# Patient Record
Sex: FEMALE | Race: WHITE | NOT HISPANIC OR LATINO | Employment: FULL TIME | URBAN - METROPOLITAN AREA
[De-identification: names, ages, dates, MRNs, and addresses within clinical notes are randomized per-mention and may not be internally consistent; named-entity substitution may affect disease eponyms.]

---

## 2024-11-06 ENCOUNTER — HOSPITAL ENCOUNTER (OUTPATIENT)
Facility: HOSPITAL | Age: 34
Discharge: HOSPICE | End: 2024-11-07
Attending: EMERGENCY MEDICINE | Admitting: EMERGENCY MEDICINE
Payer: COMMERCIAL

## 2024-11-06 ENCOUNTER — APPOINTMENT (OUTPATIENT)
Dept: CT IMAGING | Facility: HOSPITAL | Age: 34
End: 2024-11-06
Payer: COMMERCIAL

## 2024-11-06 DIAGNOSIS — R10.84 GENERALIZED ABDOMINAL PAIN: ICD-10-CM

## 2024-11-06 DIAGNOSIS — N20.0 KIDNEY STONE: Primary | ICD-10-CM

## 2024-11-06 LAB
ALBUMIN SERPL-MCNC: 4.5 G/DL (ref 3.5–5.2)
ALBUMIN/GLOB SERPL: 1.5 G/DL
ALP SERPL-CCNC: 92 U/L (ref 39–117)
ALT SERPL W P-5'-P-CCNC: 18 U/L (ref 1–33)
ANION GAP SERPL CALCULATED.3IONS-SCNC: 12.5 MMOL/L (ref 5–15)
AST SERPL-CCNC: 23 U/L (ref 1–32)
B-HCG UR QL: NEGATIVE
BACTERIA UR QL AUTO: ABNORMAL /HPF
BASOPHILS # BLD AUTO: 0.04 10*3/MM3 (ref 0–0.2)
BASOPHILS NFR BLD AUTO: 0.5 % (ref 0–1.5)
BILIRUB SERPL-MCNC: 0.3 MG/DL (ref 0–1.2)
BILIRUB UR QL STRIP: NEGATIVE
BUN SERPL-MCNC: 9 MG/DL (ref 6–20)
BUN/CREAT SERPL: 12.3 (ref 7–25)
CALCIUM SPEC-SCNC: 9.3 MG/DL (ref 8.6–10.5)
CHLORIDE SERPL-SCNC: 106 MMOL/L (ref 98–107)
CLARITY UR: ABNORMAL
CO2 SERPL-SCNC: 22.5 MMOL/L (ref 22–29)
COLOR UR: ABNORMAL
CREAT SERPL-MCNC: 0.73 MG/DL (ref 0.57–1)
DEPRECATED RDW RBC AUTO: 39.2 FL (ref 37–54)
EGFRCR SERPLBLD CKD-EPI 2021: 110.1 ML/MIN/1.73
EOSINOPHIL # BLD AUTO: 0.02 10*3/MM3 (ref 0–0.4)
EOSINOPHIL NFR BLD AUTO: 0.2 % (ref 0.3–6.2)
ERYTHROCYTE [DISTWIDTH] IN BLOOD BY AUTOMATED COUNT: 12.2 % (ref 12.3–15.4)
GLOBULIN UR ELPH-MCNC: 3.1 GM/DL
GLUCOSE SERPL-MCNC: 102 MG/DL (ref 65–99)
GLUCOSE UR STRIP-MCNC: NEGATIVE MG/DL
HCT VFR BLD AUTO: 40.5 % (ref 34–46.6)
HGB BLD-MCNC: 13.7 G/DL (ref 12–15.9)
HGB UR QL STRIP.AUTO: ABNORMAL
HOLD SPECIMEN: NORMAL
HOLD SPECIMEN: NORMAL
HYALINE CASTS UR QL AUTO: ABNORMAL /LPF
IMM GRANULOCYTES # BLD AUTO: 0.03 10*3/MM3 (ref 0–0.05)
IMM GRANULOCYTES NFR BLD AUTO: 0.3 % (ref 0–0.5)
KETONES UR QL STRIP: ABNORMAL
LEUKOCYTE ESTERASE UR QL STRIP.AUTO: ABNORMAL
LIPASE SERPL-CCNC: 27 U/L (ref 13–60)
LYMPHOCYTES # BLD AUTO: 1.11 10*3/MM3 (ref 0.7–3.1)
LYMPHOCYTES NFR BLD AUTO: 12.8 % (ref 19.6–45.3)
MCH RBC QN AUTO: 29.4 PG (ref 26.6–33)
MCHC RBC AUTO-ENTMCNC: 33.8 G/DL (ref 31.5–35.7)
MCV RBC AUTO: 86.9 FL (ref 79–97)
MONOCYTES # BLD AUTO: 0.45 10*3/MM3 (ref 0.1–0.9)
MONOCYTES NFR BLD AUTO: 5.2 % (ref 5–12)
NEUTROPHILS NFR BLD AUTO: 7.05 10*3/MM3 (ref 1.7–7)
NEUTROPHILS NFR BLD AUTO: 81 % (ref 42.7–76)
NITRITE UR QL STRIP: NEGATIVE
NRBC BLD AUTO-RTO: 0 /100 WBC (ref 0–0.2)
PH UR STRIP.AUTO: 5.5 [PH] (ref 5–8)
PLATELET # BLD AUTO: 338 10*3/MM3 (ref 140–450)
PMV BLD AUTO: 8.4 FL (ref 6–12)
POTASSIUM SERPL-SCNC: 3.3 MMOL/L (ref 3.5–5.2)
PROT SERPL-MCNC: 7.6 G/DL (ref 6–8.5)
PROT UR QL STRIP: ABNORMAL
RBC # BLD AUTO: 4.66 10*6/MM3 (ref 3.77–5.28)
RBC # UR STRIP: ABNORMAL /HPF
REF LAB TEST METHOD: ABNORMAL
SODIUM SERPL-SCNC: 141 MMOL/L (ref 136–145)
SP GR UR STRIP: 1.03 (ref 1–1.03)
SQUAMOUS #/AREA URNS HPF: ABNORMAL /HPF
UROBILINOGEN UR QL STRIP: ABNORMAL
WBC # UR STRIP: ABNORMAL /HPF
WBC NRBC COR # BLD AUTO: 8.7 10*3/MM3 (ref 3.4–10.8)
WHOLE BLOOD HOLD COAG: NORMAL
WHOLE BLOOD HOLD SPECIMEN: NORMAL

## 2024-11-06 PROCEDURE — 25010000002 CEFTRIAXONE PER 250 MG

## 2024-11-06 PROCEDURE — 25010000002 ONDANSETRON PER 1 MG

## 2024-11-06 PROCEDURE — 25510000001 IOPAMIDOL PER 1 ML

## 2024-11-06 PROCEDURE — 80053 COMPREHEN METABOLIC PANEL: CPT

## 2024-11-06 PROCEDURE — 99285 EMERGENCY DEPT VISIT HI MDM: CPT

## 2024-11-06 PROCEDURE — 81025 URINE PREGNANCY TEST: CPT

## 2024-11-06 PROCEDURE — 87040 BLOOD CULTURE FOR BACTERIA: CPT

## 2024-11-06 PROCEDURE — 25810000003 SODIUM CHLORIDE 0.9 % SOLUTION

## 2024-11-06 PROCEDURE — 87086 URINE CULTURE/COLONY COUNT: CPT

## 2024-11-06 PROCEDURE — 83690 ASSAY OF LIPASE: CPT

## 2024-11-06 PROCEDURE — 81001 URINALYSIS AUTO W/SCOPE: CPT

## 2024-11-06 PROCEDURE — G0378 HOSPITAL OBSERVATION PER HR: HCPCS

## 2024-11-06 PROCEDURE — 36415 COLL VENOUS BLD VENIPUNCTURE: CPT

## 2024-11-06 PROCEDURE — 74177 CT ABD & PELVIS W/CONTRAST: CPT

## 2024-11-06 PROCEDURE — 25010000002 MORPHINE PER 10 MG

## 2024-11-06 PROCEDURE — 96375 TX/PRO/DX INJ NEW DRUG ADDON: CPT

## 2024-11-06 PROCEDURE — 85025 COMPLETE CBC W/AUTO DIFF WBC: CPT

## 2024-11-06 PROCEDURE — 96365 THER/PROPH/DIAG IV INF INIT: CPT

## 2024-11-06 RX ORDER — BISACODYL 5 MG/1
5 TABLET, DELAYED RELEASE ORAL DAILY PRN
Status: DISCONTINUED | OUTPATIENT
Start: 2024-11-06 | End: 2024-11-07 | Stop reason: HOSPADM

## 2024-11-06 RX ORDER — ONDANSETRON 2 MG/ML
4 INJECTION INTRAMUSCULAR; INTRAVENOUS ONCE
Status: COMPLETED | OUTPATIENT
Start: 2024-11-06 | End: 2024-11-06

## 2024-11-06 RX ORDER — SODIUM CHLORIDE 0.9 % (FLUSH) 0.9 %
10 SYRINGE (ML) INJECTION AS NEEDED
Status: DISCONTINUED | OUTPATIENT
Start: 2024-11-06 | End: 2024-11-07 | Stop reason: HOSPADM

## 2024-11-06 RX ORDER — IOPAMIDOL 755 MG/ML
100 INJECTION, SOLUTION INTRAVASCULAR
Status: COMPLETED | OUTPATIENT
Start: 2024-11-06 | End: 2024-11-06

## 2024-11-06 RX ORDER — BISACODYL 10 MG
10 SUPPOSITORY, RECTAL RECTAL DAILY PRN
Status: DISCONTINUED | OUTPATIENT
Start: 2024-11-06 | End: 2024-11-07 | Stop reason: HOSPADM

## 2024-11-06 RX ORDER — AMOXICILLIN 250 MG
2 CAPSULE ORAL 2 TIMES DAILY PRN
Status: DISCONTINUED | OUTPATIENT
Start: 2024-11-06 | End: 2024-11-07 | Stop reason: HOSPADM

## 2024-11-06 RX ORDER — POLYETHYLENE GLYCOL 3350 17 G/17G
17 POWDER, FOR SOLUTION ORAL DAILY PRN
Status: DISCONTINUED | OUTPATIENT
Start: 2024-11-06 | End: 2024-11-07 | Stop reason: HOSPADM

## 2024-11-06 RX ORDER — ONDANSETRON 2 MG/ML
4 INJECTION INTRAMUSCULAR; INTRAVENOUS EVERY 6 HOURS PRN
Status: DISCONTINUED | OUTPATIENT
Start: 2024-11-06 | End: 2024-11-07 | Stop reason: HOSPADM

## 2024-11-06 RX ORDER — SODIUM CHLORIDE 0.9 % (FLUSH) 0.9 %
10 SYRINGE (ML) INJECTION EVERY 12 HOURS SCHEDULED
Status: DISCONTINUED | OUTPATIENT
Start: 2024-11-06 | End: 2024-11-07 | Stop reason: HOSPADM

## 2024-11-06 RX ORDER — NALOXONE HCL 0.4 MG/ML
0.4 VIAL (ML) INJECTION
Status: DISCONTINUED | OUTPATIENT
Start: 2024-11-06 | End: 2024-11-07 | Stop reason: HOSPADM

## 2024-11-06 RX ORDER — SODIUM CHLORIDE 9 MG/ML
75 INJECTION, SOLUTION INTRAVENOUS CONTINUOUS
Status: DISCONTINUED | OUTPATIENT
Start: 2024-11-06 | End: 2024-11-07 | Stop reason: HOSPADM

## 2024-11-06 RX ORDER — POTASSIUM CHLORIDE 1500 MG/1
40 TABLET, EXTENDED RELEASE ORAL EVERY 4 HOURS
Status: COMPLETED | OUTPATIENT
Start: 2024-11-06 | End: 2024-11-07

## 2024-11-06 RX ADMIN — MORPHINE SULFATE 4 MG: 4 INJECTION, SOLUTION INTRAMUSCULAR; INTRAVENOUS at 20:25

## 2024-11-06 RX ADMIN — ONDANSETRON 4 MG: 2 INJECTION, SOLUTION INTRAMUSCULAR; INTRAVENOUS at 20:25

## 2024-11-06 RX ADMIN — SODIUM CHLORIDE 1000 ML: 9 INJECTION, SOLUTION INTRAVENOUS at 20:25

## 2024-11-06 RX ADMIN — CEFTRIAXONE 2000 MG: 2 INJECTION, POWDER, FOR SOLUTION INTRAMUSCULAR; INTRAVENOUS at 22:20

## 2024-11-06 RX ADMIN — IOPAMIDOL 100 ML: 755 INJECTION, SOLUTION INTRAVENOUS at 20:54

## 2024-11-06 NOTE — ED TRIAGE NOTES
Pt arrived via PV c/o right sided flank pain that started approx 2 hours ago. PT reports n/v as well. Denies hx of kidney stones

## 2024-11-07 VITALS
RESPIRATION RATE: 17 BRPM | TEMPERATURE: 98 F | BODY MASS INDEX: 39.84 KG/M2 | WEIGHT: 224.87 LBS | SYSTOLIC BLOOD PRESSURE: 112 MMHG | HEIGHT: 63 IN | HEART RATE: 85 BPM | OXYGEN SATURATION: 95 % | DIASTOLIC BLOOD PRESSURE: 75 MMHG

## 2024-11-07 LAB
ANION GAP SERPL CALCULATED.3IONS-SCNC: 7.3 MMOL/L (ref 5–15)
BACTERIA SPEC AEROBE CULT: NORMAL
BASOPHILS # BLD AUTO: 0.04 10*3/MM3 (ref 0–0.2)
BASOPHILS NFR BLD AUTO: 0.6 % (ref 0–1.5)
BUN SERPL-MCNC: 8 MG/DL (ref 6–20)
BUN/CREAT SERPL: 12.3 (ref 7–25)
CALCIUM SPEC-SCNC: 8.6 MG/DL (ref 8.6–10.5)
CHLORIDE SERPL-SCNC: 109 MMOL/L (ref 98–107)
CO2 SERPL-SCNC: 24.7 MMOL/L (ref 22–29)
CREAT SERPL-MCNC: 0.65 MG/DL (ref 0.57–1)
DEPRECATED RDW RBC AUTO: 40.5 FL (ref 37–54)
EGFRCR SERPLBLD CKD-EPI 2021: 117.9 ML/MIN/1.73
EOSINOPHIL # BLD AUTO: 0.08 10*3/MM3 (ref 0–0.4)
EOSINOPHIL NFR BLD AUTO: 1.2 % (ref 0.3–6.2)
ERYTHROCYTE [DISTWIDTH] IN BLOOD BY AUTOMATED COUNT: 12.4 % (ref 12.3–15.4)
GLUCOSE SERPL-MCNC: 91 MG/DL (ref 65–99)
HCT VFR BLD AUTO: 33.9 % (ref 34–46.6)
HGB BLD-MCNC: 11 G/DL (ref 12–15.9)
IMM GRANULOCYTES # BLD AUTO: 0.01 10*3/MM3 (ref 0–0.05)
IMM GRANULOCYTES NFR BLD AUTO: 0.1 % (ref 0–0.5)
LYMPHOCYTES # BLD AUTO: 1.95 10*3/MM3 (ref 0.7–3.1)
LYMPHOCYTES NFR BLD AUTO: 29 % (ref 19.6–45.3)
MCH RBC QN AUTO: 28.9 PG (ref 26.6–33)
MCHC RBC AUTO-ENTMCNC: 32.4 G/DL (ref 31.5–35.7)
MCV RBC AUTO: 89.2 FL (ref 79–97)
MONOCYTES # BLD AUTO: 0.73 10*3/MM3 (ref 0.1–0.9)
MONOCYTES NFR BLD AUTO: 10.9 % (ref 5–12)
NEUTROPHILS NFR BLD AUTO: 3.91 10*3/MM3 (ref 1.7–7)
NEUTROPHILS NFR BLD AUTO: 58.2 % (ref 42.7–76)
NRBC BLD AUTO-RTO: 0 /100 WBC (ref 0–0.2)
PLATELET # BLD AUTO: 294 10*3/MM3 (ref 140–450)
PMV BLD AUTO: 8.7 FL (ref 6–12)
POTASSIUM SERPL-SCNC: 3.3 MMOL/L (ref 3.5–5.2)
RBC # BLD AUTO: 3.8 10*6/MM3 (ref 3.77–5.28)
SODIUM SERPL-SCNC: 141 MMOL/L (ref 136–145)
WBC NRBC COR # BLD AUTO: 6.72 10*3/MM3 (ref 3.4–10.8)

## 2024-11-07 PROCEDURE — 25810000003 SODIUM CHLORIDE 0.9 % SOLUTION: Performed by: EMERGENCY MEDICINE

## 2024-11-07 PROCEDURE — 96376 TX/PRO/DX INJ SAME DRUG ADON: CPT

## 2024-11-07 PROCEDURE — 96361 HYDRATE IV INFUSION ADD-ON: CPT

## 2024-11-07 PROCEDURE — G0378 HOSPITAL OBSERVATION PER HR: HCPCS

## 2024-11-07 PROCEDURE — 25010000002 MORPHINE PER 10 MG: Performed by: EMERGENCY MEDICINE

## 2024-11-07 PROCEDURE — 80048 BASIC METABOLIC PNL TOTAL CA: CPT | Performed by: EMERGENCY MEDICINE

## 2024-11-07 PROCEDURE — 85025 COMPLETE CBC W/AUTO DIFF WBC: CPT | Performed by: EMERGENCY MEDICINE

## 2024-11-07 RX ADMIN — SODIUM CHLORIDE 75 ML/HR: 9 INJECTION, SOLUTION INTRAVENOUS at 00:37

## 2024-11-07 RX ADMIN — POTASSIUM CHLORIDE 40 MEQ: 1500 TABLET, EXTENDED RELEASE ORAL at 04:05

## 2024-11-07 RX ADMIN — Medication 10 ML: at 00:37

## 2024-11-07 RX ADMIN — POTASSIUM CHLORIDE 40 MEQ: 1500 TABLET, EXTENDED RELEASE ORAL at 00:37

## 2024-11-07 RX ADMIN — MORPHINE SULFATE 4 MG: 4 INJECTION, SOLUTION INTRAMUSCULAR; INTRAVENOUS at 04:05

## 2024-11-07 NOTE — CASE MANAGEMENT/SOCIAL WORK
Case Management Discharge Note      Final Note: Routine home                      Final Discharge Disposition Code: 01 - home or self-care

## 2024-11-07 NOTE — DISCHARGE SUMMARY
"[facility]    Provider, No Known    CHIEF COMPLAINT:     Right flank pain    HISTORY OF PRESENT ILLNESS:    Flank Pain      ED  34-year-old female who reports today with right-sided flank pain. States she is also having some nausea vomiting subjective fever symptoms feels dehydrated and has been getting dizzy and feeling \"heavy\". Denies any dysuria. Patient is also currently on her menstrual period. Denies any history of abdominal surgeries, previous kidney stones, or pregnancy.     History reviewed. No pertinent past medical history.  Past Surgical History:   Procedure Laterality Date    OVARIAN CYST REMOVAL      TUMOR REMOVAL Left     L hand     History reviewed. No pertinent family history.  Social History     Tobacco Use    Smoking status: Never    Smokeless tobacco: Never   Vaping Use    Vaping status: Never Used   Substance Use Topics    Alcohol use: Never     Comment: occasional    Drug use: Never     No medications prior to admission.     Allergies:  Metal [nickel]      There is no immunization history on file for this patient.        REVIEW OF SYSTEMS:    Review of Systems   Gastrointestinal:  Positive for nausea and vomiting.   Genitourinary:  Positive for flank pain.           Vital Signs  Temp:  [97.7 °F (36.5 °C)-98.6 °F (37 °C)] 97.7 °F (36.5 °C)  Heart Rate:  [89-97] 97  Resp:  [16-20] 20  BP: ()/(69-81) 95/69          Physical Exam:  Physical Exam  Constitutional:       Appearance: Normal appearance.   Cardiovascular:      Rate and Rhythm: Normal rate and regular rhythm.   Abdominal:      Palpations: Abdomen is soft.   Skin:     General: Skin is warm and dry.   Neurological:      General: No focal deficit present.      Mental Status: She is alert and oriented to person, place, and time.   Psychiatric:         Mood and Affect: Mood normal.         Behavior: Behavior normal.     Emotional Behavior:    wnl   Debilities:   None      Results Review:    I reviewed the " patient's new clinical results.  Lab Results (most recent)       Procedure Component Value Units Date/Time    Basic Metabolic Panel [[id number]]  (Abnormal) Collected: 11/07/24 0415    Specimen: Blood Updated: 11/07/24 0539     Glucose 91 mg/dL      BUN 8 mg/dL      Creatinine 0.65 mg/dL      Sodium 141 mmol/L      Potassium 3.3 mmol/L      Chloride 109 mmol/L      CO2 24.7 mmol/L      Calcium 8.6 mg/dL      BUN/Creatinine Ratio 12.3     Anion Gap 7.3 mmol/L      eGFR 117.9 mL/min/1.73     Narrative:      GFR Normal >60  Chronic Kidney Disease <60  Kidney Failure <15      CBC Auto Differential [[id number]]  (Abnormal) Collected: 11/07/24 0415    Specimen: Blood Updated: 11/07/24 0455     WBC 6.72 10*3/mm3      RBC 3.80 10*6/mm3      Hemoglobin 11.0 g/dL      Comment: Result checked          Hematocrit 33.9 %      MCV 89.2 fL      MCH 28.9 pg      MCHC 32.4 g/dL      RDW 12.4 %      RDW-SD 40.5 fl      MPV 8.7 fL      Platelets 294 10*3/mm3      Neutrophil % 58.2 %      Lymphocyte % 29.0 %      Monocyte % 10.9 %      Eosinophil % 1.2 %      Basophil % 0.6 %      Immature Grans % 0.1 %      Neutrophils, Absolute 3.91 10*3/mm3      Lymphocytes, Absolute 1.95 10*3/mm3      Monocytes, Absolute 0.73 10*3/mm3      Eosinophils, Absolute 0.08 10*3/mm3      Basophils, Absolute 0.04 10*3/mm3      Immature Grans, Absolute 0.01 10*3/mm3      nRBC 0.0 /100 WBC     Blood Culture - Blood, Arm, Left [[id number]] Collected: 11/06/24 2214    Specimen: Blood from Arm, Left Updated: 11/06/24 2218    Blood Culture - Blood, Arm, Right [[id number]] Collected: 11/06/24 2215    Specimen: Blood from Arm, Right Updated: 11/06/24 2218    Urine Culture - Urine, Urine, Clean Catch [[id number]] Collected: 11/06/24 1921    Specimen: Urine, Clean Catch Updated: 11/06/24 2019    Comprehensive Metabolic Panel [[id number]]  (Abnormal) Collected: 11/06/24 1908    Specimen: Blood from Arm, Right Updated: 11/06/24 1939     Glucose 102 mg/dL      BUN 9 mg/dL       Creatinine 0.73 mg/dL      Sodium 141 mmol/L      Potassium 3.3 mmol/L      Chloride 106 mmol/L      CO2 22.5 mmol/L      Calcium 9.3 mg/dL      Total Protein 7.6 g/dL      Albumin 4.5 g/dL      ALT (SGPT) 18 U/L      AST (SGOT) 23 U/L      Alkaline Phosphatase 92 U/L      Total Bilirubin 0.3 mg/dL      Globulin 3.1 gm/dL      A/G Ratio 1.5 g/dL      BUN/Creatinine Ratio 12.3     Anion Gap 12.5 mmol/L      eGFR 110.1 mL/min/1.73     Narrative:      GFR Normal >60  Chronic Kidney Disease <60  Kidney Failure <15      Lipase [[id number]]  (Normal) Collected: 11/06/24 1908    Specimen: Blood from Arm, Right Updated: 11/06/24 1939     Lipase 27 U/L     Pregnancy, Urine - Urine, Clean Catch [[id number]]  (Normal) Collected: 11/06/24 1921    Specimen: Urine, Clean Catch Updated: 11/06/24 1937     HCG, Urine QL Negative    Urinalysis With Microscopic If Indicated (No Culture) - Urine, Clean Catch [[id number]]  (Abnormal) Collected: 11/06/24 1921    Specimen: Urine, Clean Catch Updated: 11/06/24 1934     Color, UA Dark Yellow     Appearance, UA Slightly Cloudy     pH, UA 5.5     Specific Gravity, UA 1.035     Glucose, UA Negative     Ketones, UA 15 mg/dL (1+)     Bilirubin, UA Negative     Blood, UA Large (3+)     Protein, UA 30 mg/dL (1+)     Leuk Esterase, UA Trace     Nitrite, UA Negative     Urobilinogen, UA 1.0 E.U./dL    Urinalysis, Microscopic Only - Urine, Clean Catch [[id number]]  (Abnormal) Collected: 11/06/24 1921    Specimen: Urine, Clean Catch Updated: 11/06/24 1934     RBC, UA Too Numerous to Count /HPF      WBC, UA 6-10 /HPF      Bacteria, UA None Seen /HPF      Squamous Epithelial Cells, UA 3-6 /HPF      Hyaline Casts, UA 0-2 /LPF      Methodology Automated Microscopy    CBC & Differential [[id number]]  (Abnormal) Collected: 11/06/24 1908    Specimen: Blood from Arm, Right Updated: 11/06/24 1916    Narrative:      The following orders were created for panel order CBC & Differential.  Procedure                                Abnormality         Status                     ---------                               -----------         ------                     CBC Auto Differential[[id number]]        Abnormal            Final result                 Please view results for these tests on the individual orders.    CBC Auto Differential [[id number]]  (Abnormal) Collected: 11/06/24 1908    Specimen: Blood from Arm, Right Updated: 11/06/24 1916     WBC 8.70 10*3/mm3      RBC 4.66 10*6/mm3      Hemoglobin 13.7 g/dL      Hematocrit 40.5 %      MCV 86.9 fL      MCH 29.4 pg      MCHC 33.8 g/dL      RDW 12.2 %      RDW-SD 39.2 fl      MPV 8.4 fL      Platelets 338 10*3/mm3      Neutrophil % 81.0 %      Lymphocyte % 12.8 %      Monocyte % 5.2 %      Eosinophil % 0.2 %      Basophil % 0.5 %      Immature Grans % 0.3 %      Neutrophils, Absolute 7.05 10*3/mm3      Lymphocytes, Absolute 1.11 10*3/mm3      Monocytes, Absolute 0.45 10*3/mm3      Eosinophils, Absolute 0.02 10*3/mm3      Basophils, Absolute 0.04 10*3/mm3      Immature Grans, Absolute 0.03 10*3/mm3      nRBC 0.0 /100 WBC     [place] Draw [[id number]] Collected: 11/06/24 1908    Specimen: Blood from Arm, Right Updated: 11/06/24 1915    Narrative:      The following orders were created for panel order [place] Draw.  Procedure                               Abnormality         Status                     ---------                               -----------         ------                     Green Top (Gel)[[id number]]                                  Final result               Lavender Top[[id number]]                                     Final result               Gold Top - [facility][[id number]]                                   Final result               Light Blue Top[[id number]]                                   Final result                 Please view results for these tests on the individual orders.    Green Top (Gel) [[id number]] Collected: 11/06/24 1908    Specimen: Blood from Arm, Right  Updated: 11/06/24 1915     Extra Tube Hold for add-ons.     Comment: Auto resulted.       Lavender Top [[id number]] Collected: 11/06/24 1908    Specimen: Blood from Arm, Right Updated: 11/06/24 1915     Extra Tube hold for add-on     Comment: Auto resulted       Gold Top - [facility] [[id number]] Collected: 11/06/24 1908    Specimen: Blood from Arm, Right Updated: 11/06/24 1915     Extra Tube Hold for add-ons.     Comment: Auto resulted.       Light Blue Top [[id number]] Collected: 11/06/24 1908    Specimen: Blood from Arm, Right Updated: 11/06/24 1915     Extra Tube Hold for add-ons.     Comment: Auto resulted               Imaging Results (Most Recent)       Procedure Component Value Units Date/Time    CT Abdomen Pelvis With Contrast [[id number]] Collected: 11/06/24 2133     Updated: 11/06/24 2144    Narrative:      CT ABDOMEN PELVIS W CONTRAST    Date of Exam: 11/6/2024 8:40 PM EST    Indication: right flank pain, r/o stone.    Comparison: None available.    Technique: Axial CT images were obtained of the abdomen and pelvis following the uneventful intravenous administration of iodinated contrast. Sagittal and coronal reconstructions were performed.  Automated exposure control and iterative reconstruction   methods were used.          FINDINGS:    Abdomen/Pelvis:    Lower Chest: Limited imaging of the lung bases is grossly clear.    No free air noted below the diaphragm.    Organs: Moderate hydronephrosis on the right is noted secondary to a 8 mm calculus at the right UPJ. No definite additional stones are noted within the ureter. The right kidney is otherwise grossly unremarkable in appearance. Left kidney and left renal   collecting system are grossly unremarkable in appearance.    Mildly motion limited imaging of the liver, gallbladder, spleen, pancreas and adrenal glands are grossly unremarkable in appearance    GI/Bowel: Evaluation of the GI tract is motion limited. There is a small hiatal hernia. The stomach is  otherwise grossly unremarkable in appearance. Air and fluid-filled loops of small bowel are noted without evidence of obstruction. Increased number of   small lymph nodes within the mesentery is likely reactive. Ileocecal valve and appendix appear grossly unremarkable in appearance. Motion limited imaging of the colon demonstrates no acute abnormality.    Pelvis: Uterus, ovaries and the urinary bladder are grossly unremarkable in appearance. Small cyst within the left ovary likely physiologic.    Peritoneum/Retroperitoneum: The aorta is normal in caliber. There is no suspicious retroperitoneal adenopathy    Bones/Soft Tissues: No acute osseous abnormality is noted. Degenerative changes noted of the spine.      Impression:        1. There is moderate hydronephrosis secondary to a 8 mm calculus at the right UPJ.          Electronically Signed: Hector Gamino MD    11/6/2024 9:42 PM EST    Workstation ID: [id number]          reviewed    ECG/EMG Results (most recent)       Procedure Component Value Units Date/Time    Telemetry Scan [[id number]] Resulted: 11/06/24     Updated: 11/07/24 0552          reviewed            Microbiology Results (last 10 days)       ** No results found for the last 240 hours. **            Assessment & Plan     Kidney stone     Ureterolithiasis  - cbc, cmp, lipase unremarkable  - ua trace LE, large blood  - hcg negative  - ct abd reviewed and showing a moderate hydronephrosis secondary to a 8 mm calculus at the right UPJ.   - urology consulted  - pt given iv abx and fluids in er  - pt is to be discharged to [facility] for outpt procedure with Dr Moura        I discussed the patients findings and my recommendations with patient and nursing staff.     Discharge Diagnosis:      Kidney stone      Hospital Course  Patient is a 34 y.o. female presented with flank pain. Er evaluated and admitted to observation. Labs including cbc, cmp, lipase are normal. Hcg is negative. Ua shoing blood. Ct  abdomen shows 8mm stone in right upj.  Urology consulted and recommends discharge to [facility] for procedure this am. Discharge discussed with pt and she is agreeable to plan. Instructed pt to return to er if symptoms reoccur or worsen.      Past Medical History:   History reviewed. No pertinent past medical history.    Past Surgical History:     Past Surgical History:   Procedure Laterality Date    OVARIAN CYST REMOVAL      TUMOR REMOVAL Left     L hand       Social History:   Social History     Socioeconomic History    Marital status: Single   Tobacco Use    Smoking status: Never    Smokeless tobacco: Never   Vaping Use    Vaping status: Never Used   Substance and Sexual Activity    Alcohol use: Never     Comment: occasional    Drug use: Never    Sexual activity: Defer       Procedures Performed         Consults:   Consults       Date and Time Order Name Status Description    11/6/2024 10:11 PM Inpatient Urology Consult Completed             Condition on Discharge:     Stable    Discharge Disposition      Discharge Medications     Discharge Medications      Patient Not Prescribed Medications Upon Discharge         Discharge Diet:     Activity at Discharge:     Follow-up Appointments  No future appointments.      Test Results Pending at Discharge  Pending Labs       Order Current Status    Blood Culture - Blood, Arm, Left In process    Blood Culture - Blood, Arm, Right In process    Urine Culture - Urine, Urine, Clean Catch In process             Risk for Readmission (LACE) Score: 1 (11/7/2024  6:00 AM)      Less Than 30 minutes spent in discharge activities for this patient    Signature:Electronically signed by Vickie Rascon PA-C, 11/07/24, 7:35 AM EST.

## 2024-11-07 NOTE — ED NOTES
Nursing report ED to floor  Umm Castillo  34 y.o.  female    HPI:   Chief Complaint   Patient presents with    Flank Pain       Admitting doctor:   Bandar Walters MD    Admitting diagnosis:   The primary encounter diagnosis was Kidney stone. A diagnosis of Generalized abdominal pain was also pertinent to this visit.    Code status:   Current Code Status       Date Active Code Status Order ID Comments User Context       11/6/2024 2211 CPR (Attempt to Resuscitate) [id number]  Clementine Azar APRN ED        Question Answer    Code Status (Patient has no pulse and is not breathing) CPR (Attempt to Resuscitate)    Medical Interventions (Patient has pulse or is breathing) Full Support    Level Of Support Discussed With Patient                    Allergies:   Patient has no known allergies.    Isolation:  No active isolations     Fall Risk:  Fall Risk Assessment was completed, and patient is at low risk for falls.   Predictive Model Details         1 (Low) Factor Value    Calculated 11/6/2024 22:27 Age 34    Risk of Fall Model Active Peripheral IV Present     Imaging order in this encounter Present     Number of Distinct Medication Classes administered 5     Magnesium not on file     Drug Use Not Asked     Diastolic BP 70     Bubba Scale not on file     Number of administrations of Analgesic Narcotics 1     Respiratory Rate 16     Calcium 9.3 mg/dL     Days after Admission 0.156     Chloride 106 mmol/L     ALT 18 U/L     Albumin 4.5 g/dL     Tobacco Use Not Asked     Total Bilirubin 0.3 mg/dL     Potassium 3.3 mmol/L     Creatinine 0.73 mg/dL         Weight:       11/06/24  1824   Weight: 102 kg (224 lb 13.9 oz)       Intake and Output  No intake or output data in the 24 hours ending 11/06/24 2233    Diet:   Dietary Orders (From admission, onward)       Start     Ordered    11/07/24 0001  NPO Diet NPO Type: Strict NPO  Diet Effective Midnight        Question:  NPO Type  Answer:  Strict NPO    11/06/24 2211    11/06/24  1902  NPO Diet NPO Type: Strict NPO  Diet Effective Now        Question:  NPO Type  Answer:  Strict NPO    11/06/24 1901                     Most recent vitals:   Vitals:    11/06/24 1901 11/06/24 1931 11/06/24 2031 11/06/24 2131   BP: 122/80 115/72 116/81 104/70   Patient Position:   Lying    Pulse: 93 95 97 89   Resp:   16 16   Temp:       SpO2: 94% 94% 100% 100%   Weight:       Height:           Active LDAs/IV Access:   Lines, Drains & Airways       Active LDAs       Name Placement date Placement time Site Days    Peripheral IV 11/06/24 1909 Distal;Posterior;Right Forearm 11/06/24 1909  Forearm  less than 1                    Skin Condition:   Skin Assessments (last day)       None             Labs (abnormal labs have a star):   Labs Reviewed   COMPREHENSIVE METABOLIC PANEL - Abnormal; Notable for the following components:       Result Value    Glucose 102 (*)     Potassium 3.3 (*)     All other components within normal limits    Narrative:     GFR Normal >60  Chronic Kidney Disease <60  Kidney Failure <15     URINALYSIS W/ MICROSCOPIC IF INDICATED (NO CULTURE) - Abnormal; Notable for the following components:    Color, UA Dark Yellow (*)     Appearance, UA Slightly Cloudy (*)     Specific Gravity, UA 1.035 (*)     Ketones, UA 15 mg/dL (1+) (*)     Blood, UA Large (3+) (*)     Protein, UA 30 mg/dL (1+) (*)     Leuk Esterase, UA Trace (*)     All other components within normal limits   CBC WITH AUTO DIFFERENTIAL - Abnormal; Notable for the following components:    RDW 12.2 (*)     Neutrophil % 81.0 (*)     Lymphocyte % 12.8 (*)     Eosinophil % 0.2 (*)     Neutrophils, Absolute 7.05 (*)     All other components within normal limits   URINALYSIS, MICROSCOPIC ONLY - Abnormal; Notable for the following components:    RBC, UA Too Numerous to Count (*)     WBC, UA 6-10 (*)     Squamous Epithelial Cells, UA 3-6 (*)     All other components within normal limits   LIPASE - Normal   PREGNANCY, URINE - Normal   URINE  CULTURE   BLOOD CULTURE   BLOOD CULTURE   RAINBOW DRAW    Narrative:     The following orders were created for panel order [place] Draw.  Procedure                               Abnormality         Status                     ---------                               -----------         ------                     Green Top (Gel)[[id number]]                                  Final result               Lavender Top[[id number]]                                     Final result               Gold Top - [facility][[id number]]                                   Final result               Light Blue Top[[id number]]                                   Final result                 Please view results for these tests on the individual orders.   BASIC METABOLIC PANEL   CBC WITH AUTO DIFFERENTIAL   CBC AND DIFFERENTIAL    Narrative:     The following orders were created for panel order CBC & Differential.  Procedure                               Abnormality         Status                     ---------                               -----------         ------                     CBC Auto Differential[[id number]]        Abnormal            Final result                 Please view results for these tests on the individual orders.   GREEN TOP   LAVENDER TOP   GOLD TOP - [facility]ST   LIGHT BLUE TOP       LOC: Person, Place, Time, and Situation    Telemetry:  Observation Unit    Cardiac Monitoring Ordered: yes    EKG:   No orders to display       Medications Given in the ED:   Medications   sodium chloride 0.9 % flush 10 mL (has no administration in time range)   cefTRIAXone (ROCEPHIN) 2,000 mg in sodium chloride 0.9 % 100 mL MBP (2,000 mg Intravenous New Bag 11/6/24 2220)   sodium chloride 0.9 % flush 10 mL (has no administration in time range)   sodium chloride 0.9 % flush 10 mL (has no administration in time range)   ondansetron (ZOFRAN) injection 4 mg (has no administration in time range)   melatonin tablet 5 mg (has no administration in time range)    Potassium Replacement - Follow Nurse / BPA Driven Protocol (has no administration in time range)   Magnesium Standard Dose Replacement - Follow Nurse / BPA Driven Protocol (has no administration in time range)   Phosphorus Replacement - Follow Nurse / BPA Driven Protocol (has no administration in time range)   Calcium Replacement - Follow Nurse / BPA Driven Protocol (has no administration in time range)   sodium chloride 0.9 % infusion (has no administration in time range)   morphine injection 4 mg (has no administration in time range)     And   naloxone (NARCAN) injection 0.4 mg (has no administration in time range)   sennosides-docusate (PERICOLACE) 8.6-50 MG per tablet 2 tablet (has no administration in time range)     And   polyethylene glycol (MIRALAX) packet 17 g (has no administration in time range)     And   bisacodyl (DULCOLAX) EC tablet 5 mg (has no administration in time range)     And   bisacodyl (DULCOLAX) suppository 10 mg (has no administration in time range)   potassium chloride (KLOR-CON M20) CR tablet 40 mEq (has no administration in time range)   sodium chloride 0.9 % bolus 1,000 mL (0 mL Intravenous Stopped 11/6/24 2127)   ondansetron (ZOFRAN) injection 4 mg (4 mg Intravenous Given 11/6/24 2025)   morphine injection 4 mg (4 mg Intravenous Given 11/6/24 2025)   iopamidol (ISOVUE-370) 76 % injection 100 mL (100 mL Intravenous Given 11/6/24 2054)       Imaging results:  CT Abdomen Pelvis With Contrast    Result Date: 11/6/2024  1. There is moderate hydronephrosis secondary to a 8 mm calculus at the right UPJ. Electronically Signed: Hector Gamino MD  11/6/2024 9:42 PM EST  Workstation ID: [id number]     Social issues:   Social History     Socioeconomic History    Marital status: Single       NIH Stroke Scale:  Interval: (not recorded)  1a. Level of Consciousness: (not recorded)  1b. LOC Questions: (not recorded)  1c. LOC Commands: (not recorded)  2. Best Gaze: (not recorded)  3. Visual: (not  recorded)  4. Facial Palsy: (not recorded)  5a. Motor Arm, Left: (not recorded)  5b. Motor Arm, Right: (not recorded)  6a. Motor Leg, Left: (not recorded)  6b. Motor Leg, Right: (not recorded)  7. Limb Ataxia: (not recorded)  8. Sensory: (not recorded)  9. Best Language: (not recorded)  10. Dysarthria: (not recorded)  11. Extinction and Inattention (formerly Neglect): (not recorded)    Total (NIH Stroke Scale): (not recorded)     Additional notable assessment information:     Nursing report ED to floor:  OBS LEILANI Allen RN   11/06/24 22:33 EST

## 2024-11-07 NOTE — PLAN OF CARE
Goal Outcome Evaluation:    Plan for ESWL at [facility] per urology. Will DC

## 2024-11-07 NOTE — ED PROVIDER NOTES
"Subjective   Chief Complaint   Patient presents with    Flank Pain       History of Present Illness  Patient is a 34-year-old female who reports today with right-sided flank pain.  States she is also having some nausea vomiting subjective fever symptoms feels dehydrated and has been getting dizzy and feeling \"heavy\".  Denies any dysuria.  Patient is also currently on her menstrual period.  Denies any history of abdominal surgeries, previous kidney stones, or pregnancy.  Review of Systems  Per HPI  History reviewed. No pertinent past medical history.    Allergies   Allergen Reactions    Metal [Nickel] Hives, Itching and Irritability       Past Surgical History:   Procedure Laterality Date    OVARIAN CYST REMOVAL      TUMOR REMOVAL Left     L hand       History reviewed. No pertinent family history.    Social History     Socioeconomic History    Marital status: Single   Tobacco Use    Smoking status: Never    Smokeless tobacco: Never   Vaping Use    Vaping status: Never Used   Substance and Sexual Activity    Alcohol use: Never     Comment: occasional    Drug use: Never    Sexual activity: Defer           Objective   Physical Exam  Vitals and nursing note reviewed.   Constitutional:       General: She is not in acute distress.     Appearance: Normal appearance. She is not ill-appearing, toxic-appearing or diaphoretic.   HENT:      Head: Normocephalic and atraumatic.      Nose: Nose normal.      Mouth/Throat:      Mouth: Mucous membranes are moist.      Pharynx: Oropharynx is clear.   Eyes:      Extraocular Movements: Extraocular movements intact.      Conjunctiva/sclera: Conjunctivae normal.      Pupils: Pupils are equal, round, and reactive to light.   Cardiovascular:      Rate and Rhythm: Normal rate and regular rhythm.      Pulses: Normal pulses.      Heart sounds: Normal heart sounds.   Pulmonary:      Effort: Pulmonary effort is normal.      Breath sounds: Normal breath sounds.   Abdominal:      General: Bowel " "sounds are normal.      Palpations: Abdomen is soft.   Musculoskeletal:         General: Normal range of motion.      Cervical back: Normal range of motion and neck supple.   Skin:     General: Skin is warm and dry.      Capillary Refill: Capillary refill takes less than 2 seconds.   Neurological:      General: No focal deficit present.      Mental Status: She is alert.   Psychiatric:         Mood and Affect: Mood normal.         Behavior: Behavior normal.         Thought Content: Thought content normal.         Judgment: Judgment normal.         Procedures           ED Course  ED Course as of 11/07/24 0455   Wed Nov 06, 2024 1946  marked ready for CAT scan [DT]   1947 RBC, UA(!): Too Numerous to Count  Patient is on her menstrual cycle [DT]      ED Course User Index  [DT] Clementine Azar, APRN      BP 95/69 (BP Location: Left arm, Patient Position: Lying)   Pulse 97   Temp 97.7 °F (36.5 °C) (Oral)   Resp 20   Ht 160 cm (63\")   Wt 102 kg (224 lb 13.9 oz)   LMP 11/04/2024 (Exact Date)   SpO2 97%   BMI 39.83 kg/m²   Labs Reviewed   COMPREHENSIVE METABOLIC PANEL - Abnormal; Notable for the following components:       Result Value    Glucose 102 (*)     Potassium 3.3 (*)     All other components within normal limits    Narrative:     GFR Normal >60  Chronic Kidney Disease <60  Kidney Failure <15     URINALYSIS W/ MICROSCOPIC IF INDICATED (NO CULTURE) - Abnormal; Notable for the following components:    Color, UA Dark Yellow (*)     Appearance, UA Slightly Cloudy (*)     Specific Gravity, UA 1.035 (*)     Ketones, UA 15 mg/dL (1+) (*)     Blood, UA Large (3+) (*)     Protein, UA 30 mg/dL (1+) (*)     Leuk Esterase, UA Trace (*)     All other components within normal limits   CBC WITH AUTO DIFFERENTIAL - Abnormal; Notable for the following components:    RDW 12.2 (*)     Neutrophil % 81.0 (*)     Lymphocyte % 12.8 (*)     Eosinophil % 0.2 (*)     Neutrophils, Absolute 7.05 (*)     All other components within " normal limits   URINALYSIS, MICROSCOPIC ONLY - Abnormal; Notable for the following components:    RBC, UA Too Numerous to Count (*)     WBC, UA 6-10 (*)     Squamous Epithelial Cells, UA 3-6 (*)     All other components within normal limits   LIPASE - Normal   PREGNANCY, URINE - Normal   URINE CULTURE   BLOOD CULTURE   BLOOD CULTURE   RAINBOW DRAW    Narrative:     The following orders were created for panel order [place] Draw.  Procedure                               Abnormality         Status                     ---------                               -----------         ------                     Green Top (Gel)[[id number]]                                  Final result               Lavender Top[[id number]]                                     Final result               Gold Top - SST[[id number]]                                   Final result               Light Blue Top[[id number]]                                   Final result                 Please view results for these tests on the individual orders.   BASIC METABOLIC PANEL   CBC WITH AUTO DIFFERENTIAL   CBC AND DIFFERENTIAL    Narrative:     The following orders were created for panel order CBC & Differential.  Procedure                               Abnormality         Status                     ---------                               -----------         ------                     CBC Auto Differential[[id number]]        Abnormal            Final result                 Please view results for these tests on the individual orders.   GREEN TOP   LAVENDER TOP   GOLD TOP - SST   LIGHT BLUE TOP     .dmeds  CT Abdomen Pelvis With Contrast    Result Date: 11/6/2024  1. There is moderate hydronephrosis secondary to a 8 mm calculus at the right UPJ. Electronically Signed: Hector Gamino MD  11/6/2024 9:42 PM EST  Workstation ID: [id number]                                            Medical Decision Making    Patient presented with right-sided flank pain.  History  obtained from patient.    EKG reviewed: Not indicated    Labs reviewed:   Labs Reviewed   COMPREHENSIVE METABOLIC PANEL - Abnormal; Notable for the following components:       Result Value    Glucose 102 (*)     Potassium 3.3 (*)     All other components within normal limits    Narrative:     GFR Normal >60  Chronic Kidney Disease <60  Kidney Failure <15     URINALYSIS W/ MICROSCOPIC IF INDICATED (NO CULTURE) - Abnormal; Notable for the following components:    Color, UA Dark Yellow (*)     Appearance, UA Slightly Cloudy (*)     Specific Gravity, UA 1.035 (*)     Ketones, UA 15 mg/dL (1+) (*)     Blood, UA Large (3+) (*)     Protein, UA 30 mg/dL (1+) (*)     Leuk Esterase, UA Trace (*)     All other components within normal limits   CBC WITH AUTO DIFFERENTIAL - Abnormal; Notable for the following components:    RDW 12.2 (*)     Neutrophil % 81.0 (*)     Lymphocyte % 12.8 (*)     Eosinophil % 0.2 (*)     Neutrophils, Absolute 7.05 (*)     All other components within normal limits   URINALYSIS, MICROSCOPIC ONLY - Abnormal; Notable for the following components:    RBC, UA Too Numerous to Count (*)     WBC, UA 6-10 (*)     Squamous Epithelial Cells, UA 3-6 (*)     All other components within normal limits   LIPASE - Normal   PREGNANCY, URINE - Normal   URINE CULTURE   BLOOD CULTURE   BLOOD CULTURE   RAINBOW DRAW    Narrative:     The following orders were created for panel order [place] Draw.  Procedure                               Abnormality         Status                     ---------                               -----------         ------                     Green Top (Gel)[[id number]]                                  Final result               Lavender Top[[id number]]                                     Final result               Gold Top - [facility][[id number]]                                   Final result               Light Blue Top[[id number]]                                   Final result                 Please view  results for these tests on the individual orders.   BASIC METABOLIC PANEL   CBC WITH AUTO DIFFERENTIAL   CBC AND DIFFERENTIAL    Narrative:     The following orders were created for panel order CBC & Differential.  Procedure                               Abnormality         Status                     ---------                               -----------         ------                     CBC Auto Differential[[id number]]        Abnormal            Final result                 Please view results for these tests on the individual orders.   GREEN TOP   LAVENDER TOP   GOLD TOP - SST   LIGHT BLUE TOP         Imaging reviewed: CT Abdomen Pelvis With Contrast    Result Date: 11/6/2024  1. There is moderate hydronephrosis secondary to a 8 mm calculus at the right UPJ. Electronically Signed: Hector Gamino MD  11/6/2024 9:42 PM EST  Workstation ID: [id number]     No internal or external records available for review    Differential diagnosis considered: Ureterolithiasis, cholecystitis, appendicitis  Patient was treated with the following while in the ED.    Medications   sodium chloride 0.9 % flush 10 mL (has no administration in time range)   sodium chloride 0.9 % flush 10 mL (10 mL Intravenous Given 11/7/24 0037)   sodium chloride 0.9 % flush 10 mL (has no administration in time range)   ondansetron (ZOFRAN) injection 4 mg (has no administration in time range)   melatonin tablet 5 mg (has no administration in time range)   Potassium Replacement - Follow Nurse / BPA Driven Protocol (has no administration in time range)   Magnesium Standard Dose Replacement - Follow Nurse / BPA Driven Protocol (has no administration in time range)   Phosphorus Replacement - Follow Nurse / BPA Driven Protocol (has no administration in time range)   Calcium Replacement - Follow Nurse / BPA Driven Protocol (has no administration in time range)   sodium chloride 0.9 % infusion (75 mL/hr Intravenous Currently Infusing 11/7/24 0404)   morphine  injection 4 mg (4 mg Intravenous Given 11/7/24 0405)     And   naloxone (NARCAN) injection 0.4 mg (has no administration in time range)   sennosides-docusate (PERICOLACE) 8.6-50 MG per tablet 2 tablet (has no administration in time range)     And   polyethylene glycol (MIRALAX) packet 17 g (has no administration in time range)     And   bisacodyl (DULCOLAX) EC tablet 5 mg (has no administration in time range)     And   bisacodyl (DULCOLAX) suppository 10 mg (has no administration in time range)   sodium chloride 0.9 % bolus 1,000 mL (0 mL Intravenous Stopped 11/6/24 2127)   ondansetron (ZOFRAN) injection 4 mg (4 mg Intravenous Given 11/6/24 2025)   morphine injection 4 mg (4 mg Intravenous Given 11/6/24 2025)   iopamidol (ISOVUE-370) 76 % injection 100 mL (100 mL Intravenous Given 11/6/24 2054)   cefTRIAXone (ROCEPHIN) 2,000 mg in sodium chloride 0.9 % 100 mL MBP (0 mg Intravenous Stopped 11/7/24 0058)   potassium chloride (KLOR-CON M20) CR tablet 40 mEq (40 mEq Oral Given 11/7/24 0405)   Upon evaluation of patient IV and imaging were obtained.  Imaging as above.  Patient's urine is dark and shows dehydration however white count negative CMP essentially unremarkable with the exception of potassium 3.3 which was replaced lipase negative urine culture pending pregnancy test negative blood cultures pending.  Discussed these results with patient and wish to see urology in the morning.  Patient and her mother discussed this and patient agrees to stay in observation overnight to see urology in the morning.  Pain well-controlled at time of admission.    Final diagnoses:   Kidney stone   Generalized abdominal pain       ED Disposition  ED Disposition       ED Disposition   Decision to Admit    Condition   --    Comment   --               No follow-up provider specified.       Medication List      No changes were made to your prescriptions during this visit.            Clementine Azra, APRN  11/07/24 0455

## 2024-11-07 NOTE — PLAN OF CARE
Problem: Adult Inpatient Plan of Care  Goal: Absence of Hospital-Acquired Illness or Injury  Intervention: Identify and Manage Fall Risk  Recent Flowsheet Documentation  Taken 11/7/2024 0405 by Fanny Jackson RN  Safety Promotion/Fall Prevention: safety round/check completed  Taken 11/7/2024 0053 by Fanny Jackson RN  Safety Promotion/Fall Prevention: safety round/check completed  Intervention: Prevent Skin Injury  Recent Flowsheet Documentation  Taken 11/7/2024 0053 by Fanny Jackson RN  Body Position: position changed independently  Skin Protection: transparent dressing maintained  Intervention: Prevent and Manage VTE (Venous Thromboembolism) Risk  Recent Flowsheet Documentation  Taken 11/7/2024 0053 by Fanny Jackson RN  VTE Prevention/Management: (freq ambulation) --  Intervention: Prevent Infection  Recent Flowsheet Documentation  Taken 11/7/2024 0053 by Fanny Jackson RN  Infection Prevention:   rest/sleep promoted   single patient room provided  Goal: Optimal Comfort and Wellbeing  Intervention: Monitor Pain and Promote Comfort  Recent Flowsheet Documentation  Taken 11/7/2024 0405 by Fanny Jackson RN  Pain Management Interventions: pain medication given  Taken 11/7/2024 0053 by Fanny Jackson RN  Pain Management Interventions:   relaxation techniques promoted   pillow support provided   position adjusted  Intervention: Provide Person-Centered Care  Recent Flowsheet Documentation  Taken 11/7/2024 0053 by Fanny Jackson RN  Trust Relationship/Rapport:   choices provided   care explained   emotional support provided   empathic listening provided   questions answered   questions encouraged   reassurance provided   thoughts/feelings acknowledged  Goal: Readiness for Transition of Care  Intervention: Mutually Develop Transition Plan  Recent Flowsheet Documentation  Taken 11/7/2024 0052 by Fanny Jackson RN  Transportation Anticipated:   car, drives self   family or friend will  provide  Patient/Family Anticipated Services at Transition: none  Patient/Family Anticipates Transition to: home with family  Taken 11/7/2024 0050 by Fanny Jackson, RN  Equipment Currently Used at Home: none     Problem: Pain Acute  Goal: Optimal Pain Control and Function  Intervention: Optimize Psychosocial Wellbeing  Recent Flowsheet Documentation  Taken 11/7/2024 0053 by Fanny Jackson, RN  Diversional Activities:   smartphone   television  Intervention: Develop Pain Management Plan  Recent Flowsheet Documentation  Taken 11/7/2024 0405 by Fanny Jackson, RN  Pain Management Interventions: pain medication given  Taken 11/7/2024 0053 by Fanny Jackson, RN  Pain Management Interventions:   relaxation techniques promoted   pillow support provided   position adjusted  Intervention: Prevent or Manage Pain  Recent Flowsheet Documentation  Taken 11/7/2024 0053 by Fanny Jackson, RN  Medication Review/Management: medications reviewed   Goal Outcome Evaluation:

## 2024-11-07 NOTE — CONSULTS
Urology Consult Note    Patient:Umm Castillo :1990  Room:221/1  Admit Date2024  Age:34 y.o.     SEX:female     DOS:2024     MR:[id number]     Visit:[id number]       Attending: Bandar Walters MD  Referring Provider: Dr. Walters  Reason for Consultation: Right ureteral calculus    Patient Care Team:  Provider, No Known as PCP - General    Chief complaint right flank pain    Subjective .     History of present illness: 34-year-old woman with no history of renal stone disease.  Patient had onset of right flank pain yesterday.  This was a colicky type pain.  It is better with IV narcotics.  Patient denies any fevers or chills.  She did have associated nausea and vomiting.  CT scan shows an 8 mm stone at the right ureteropelvic junction which is causing obstruction.  No other stones are seen.    Review of Systems  10 point review of systems were reviewed and are negative except for:  Constitution:  positive for See HPI    History  History reviewed. No pertinent past medical history.  Past Surgical History:   Procedure Laterality Date    OVARIAN CYST REMOVAL      TUMOR REMOVAL Left     L hand     Social History     Socioeconomic History    Marital status: Single   Tobacco Use    Smoking status: Never    Smokeless tobacco: Never   Vaping Use    Vaping status: Never Used   Substance and Sexual Activity    Alcohol use: Never     Comment: occasional    Drug use: Never    Sexual activity: Defer     History reviewed. No pertinent family history.  Allergy  Allergies   Allergen Reactions    Metal [Nickel] Hives, Itching and Irritability     Prior to Admission medications    Not on File         Objective     tMax 24 hours:  Temp (24hrs), Av.1 °F (36.7 °C), Min:97.7 °F (36.5 °C), Max:98.6 °F (37 °C)    Vital Sign Ranges:  Temp:  [97.7 °F (36.5 °C)-98.6 °F (37 °C)] 97.7 °F (36.5 °C)  Heart Rate:  [89-97] 97  Resp:  [16-20] 20  BP: ()/(69-81) 95/69  Intake and Output Last 3 Shifts:  No intake/output data  recorded.      Physical Exam:   General Appearance: alert, appears stated age, and cooperative  Head: normocephalic, without obvious abnormality and atraumatic  Abdomen: no guarding and no rebound tenderness  Skin: no bleeding, bruising or rash  Neurologic: Mental Status orientated to person, place, time and situation    Results Review:     Lab Results (last 24 hours)       Procedure Component Value Units Date/Time    Basic Metabolic Panel [[id number]]  (Abnormal) Collected: 11/07/24 0415    Specimen: Blood Updated: 11/07/24 0539     Glucose 91 mg/dL      BUN 8 mg/dL      Creatinine 0.65 mg/dL      Sodium 141 mmol/L      Potassium 3.3 mmol/L      Chloride 109 mmol/L      CO2 24.7 mmol/L      Calcium 8.6 mg/dL      BUN/Creatinine Ratio 12.3     Anion Gap 7.3 mmol/L      eGFR 117.9 mL/min/1.73     Narrative:      GFR Normal >60  Chronic Kidney Disease <60  Kidney Failure <15      CBC Auto Differential [[id number]]  (Abnormal) Collected: 11/07/24 0415    Specimen: Blood Updated: 11/07/24 0455     WBC 6.72 10*3/mm3      RBC 3.80 10*6/mm3      Hemoglobin 11.0 g/dL      Comment: Result checked          Hematocrit 33.9 %      MCV 89.2 fL      MCH 28.9 pg      MCHC 32.4 g/dL      RDW 12.4 %      RDW-SD 40.5 fl      MPV 8.7 fL      Platelets 294 10*3/mm3      Neutrophil % 58.2 %      Lymphocyte % 29.0 %      Monocyte % 10.9 %      Eosinophil % 1.2 %      Basophil % 0.6 %      Immature Grans % 0.1 %      Neutrophils, Absolute 3.91 10*3/mm3      Lymphocytes, Absolute 1.95 10*3/mm3      Monocytes, Absolute 0.73 10*3/mm3      Eosinophils, Absolute 0.08 10*3/mm3      Basophils, Absolute 0.04 10*3/mm3      Immature Grans, Absolute 0.01 10*3/mm3      nRBC 0.0 /100 WBC     Blood Culture - Blood, Arm, Left [[id number]] Collected: 11/06/24 2214    Specimen: Blood from Arm, Left Updated: 11/06/24 2218    Blood Culture - Blood, Arm, Right [[id number]] Collected: 11/06/24 2215    Specimen: Blood from Arm, Right Updated: 11/06/24 2218     Urine Culture - Urine, Urine, Clean Catch [[id number]] Collected: 11/06/24 1921    Specimen: Urine, Clean Catch Updated: 11/06/24 2019    Comprehensive Metabolic Panel [[id number]]  (Abnormal) Collected: 11/06/24 1908    Specimen: Blood from Arm, Right Updated: 11/06/24 1939     Glucose 102 mg/dL      BUN 9 mg/dL      Creatinine 0.73 mg/dL      Sodium 141 mmol/L      Potassium 3.3 mmol/L      Chloride 106 mmol/L      CO2 22.5 mmol/L      Calcium 9.3 mg/dL      Total Protein 7.6 g/dL      Albumin 4.5 g/dL      ALT (SGPT) 18 U/L      AST (SGOT) 23 U/L      Alkaline Phosphatase 92 U/L      Total Bilirubin 0.3 mg/dL      Globulin 3.1 gm/dL      A/G Ratio 1.5 g/dL      BUN/Creatinine Ratio 12.3     Anion Gap 12.5 mmol/L      eGFR 110.1 mL/min/1.73     Narrative:      GFR Normal >60  Chronic Kidney Disease <60  Kidney Failure <15      Lipase [[id number]]  (Normal) Collected: 11/06/24 1908    Specimen: Blood from Arm, Right Updated: 11/06/24 1939     Lipase 27 U/L     Pregnancy, Urine - Urine, Clean Catch [[id number]]  (Normal) Collected: 11/06/24 1921    Specimen: Urine, Clean Catch Updated: 11/06/24 1937     HCG, Urine QL Negative    Urinalysis With Microscopic If Indicated (No Culture) - Urine, Clean Catch [[id number]]  (Abnormal) Collected: 11/06/24 1921    Specimen: Urine, Clean Catch Updated: 11/06/24 1934     Color, UA Dark Yellow     Appearance, UA Slightly Cloudy     pH, UA 5.5     Specific Gravity, UA 1.035     Glucose, UA Negative     Ketones, UA 15 mg/dL (1+)     Bilirubin, UA Negative     Blood, UA Large (3+)     Protein, UA 30 mg/dL (1+)     Leuk Esterase, UA Trace     Nitrite, UA Negative     Urobilinogen, UA 1.0 E.U./dL    Urinalysis, Microscopic Only - Urine, Clean Catch [[id number]]  (Abnormal) Collected: 11/06/24 1921    Specimen: Urine, Clean Catch Updated: 11/06/24 1934     RBC, UA Too Numerous to Count /HPF      WBC, UA 6-10 /HPF      Bacteria, UA None Seen /HPF      Squamous Epithelial Cells, UA 3-6 /HPF       Hyaline Casts, UA 0-2 /LPF      Methodology Automated Microscopy    CBC & Differential [[id number]]  (Abnormal) Collected: 11/06/24 1908    Specimen: Blood from Arm, Right Updated: 11/06/24 1916    Narrative:      The following orders were created for panel order CBC & Differential.  Procedure                               Abnormality         Status                     ---------                               -----------         ------                     CBC Auto Differential[[id number]]        Abnormal            Final result                 Please view results for these tests on the individual orders.    CBC Auto Differential [[id number]]  (Abnormal) Collected: 11/06/24 1908    Specimen: Blood from Arm, Right Updated: 11/06/24 1916     WBC 8.70 10*3/mm3      RBC 4.66 10*6/mm3      Hemoglobin 13.7 g/dL      Hematocrit 40.5 %      MCV 86.9 fL      MCH 29.4 pg      MCHC 33.8 g/dL      RDW 12.2 %      RDW-SD 39.2 fl      MPV 8.4 fL      Platelets 338 10*3/mm3      Neutrophil % 81.0 %      Lymphocyte % 12.8 %      Monocyte % 5.2 %      Eosinophil % 0.2 %      Basophil % 0.5 %      Immature Grans % 0.3 %      Neutrophils, Absolute 7.05 10*3/mm3      Lymphocytes, Absolute 1.11 10*3/mm3      Monocytes, Absolute 0.45 10*3/mm3      Eosinophils, Absolute 0.02 10*3/mm3      Basophils, Absolute 0.04 10*3/mm3      Immature Grans, Absolute 0.03 10*3/mm3      nRBC 0.0 /100 WBC     [place] Draw [[id number]] Collected: 11/06/24 1908    Specimen: Blood from Arm, Right Updated: 11/06/24 1915    Narrative:      The following orders were created for panel order [place] Draw.  Procedure                               Abnormality         Status                     ---------                               -----------         ------                     Green Top (Gel)[[id number]]                                  Final result               Lavender Top[[id number]]                                     Final result               Gold Top -  "SST[[id number]]                                   Final result               Light Blue Top[[id number]]                                   Final result                 Please view results for these tests on the individual orders.    Green Top (Gel) [[id number]] Collected: 11/06/24 1908    Specimen: Blood from Arm, Right Updated: 11/06/24 1915     Extra Tube Hold for add-ons.     Comment: Auto resulted.       Lavender Top [[id number]] Collected: 11/06/24 1908    Specimen: Blood from Arm, Right Updated: 11/06/24 1915     Extra Tube hold for add-on     Comment: Auto resulted       Gold Top - SST [[id number]] Collected: 11/06/24 1908    Specimen: Blood from Arm, Right Updated: 11/06/24 1915     Extra Tube Hold for add-ons.     Comment: Auto resulted.       Light Blue Top [[id number]] Collected: 11/06/24 1908    Specimen: Blood from Arm, Right Updated: 11/06/24 1915     Extra Tube Hold for add-ons.     Comment: Auto resulted              No results found for: \"URINECX\"     Imaging Results (Last 7 Days)       Procedure Component Value Units Date/Time    CT Abdomen Pelvis With Contrast [[id number]] Collected: 11/06/24 2133     Updated: 11/06/24 2144    Narrative:      CT ABDOMEN PELVIS W CONTRAST    Date of Exam: 11/6/2024 8:40 PM EST    Indication: right flank pain, r/o stone.    Comparison: None available.    Technique: Axial CT images were obtained of the abdomen and pelvis following the uneventful intravenous administration of iodinated contrast. Sagittal and coronal reconstructions were performed.  Automated exposure control and iterative reconstruction   methods were used.          FINDINGS:    Abdomen/Pelvis:    Lower Chest: Limited imaging of the lung bases is grossly clear.    No free air noted below the diaphragm.    Organs: Moderate hydronephrosis on the right is noted secondary to a 8 mm calculus at the right UPJ. No definite additional stones are noted within the ureter. The right kidney is otherwise grossly " unremarkable in appearance. Left kidney and left renal   collecting system are grossly unremarkable in appearance.    Mildly motion limited imaging of the liver, gallbladder, spleen, pancreas and adrenal glands are grossly unremarkable in appearance    GI/Bowel: Evaluation of the GI tract is motion limited. There is a small hiatal hernia. The stomach is otherwise grossly unremarkable in appearance. Air and fluid-filled loops of small bowel are noted without evidence of obstruction. Increased number of   small lymph nodes within the mesentery is likely reactive. Ileocecal valve and appendix appear grossly unremarkable in appearance. Motion limited imaging of the colon demonstrates no acute abnormality.    Pelvis: Uterus, ovaries and the urinary bladder are grossly unremarkable in appearance. Small cyst within the left ovary likely physiologic.    Peritoneum/Retroperitoneum: The aorta is normal in caliber. There is no suspicious retroperitoneal adenopathy    Bones/Soft Tissues: No acute osseous abnormality is noted. Degenerative changes noted of the spine.      Impression:        1. There is moderate hydronephrosis secondary to a 8 mm calculus at the right UPJ.          Electronically Signed: Hector Gamino MD    11/6/2024 9:42 PM EST    Workstation ID: [id number]            Inpatient Meds:   Scheduled Meds:sodium chloride, 10 mL, Intravenous, Q12H       Continuous Infusions:sodium chloride, 75 mL/hr, Last Rate: 75 mL/hr (11/07/24 0607)       PRN Meds:.  senna-docusate sodium **AND** polyethylene glycol **AND** bisacodyl **AND** bisacodyl    Calcium Replacement - Follow Nurse / BPA Driven Protocol    Magnesium Standard Dose Replacement - Follow Nurse / BPA Driven Protocol    melatonin    Morphine **AND** naloxone    ondansetron    Phosphorus Replacement - Follow Nurse / BPA Driven Protocol    [COMPLETED] Insert Peripheral IV **AND** sodium chloride    sodium chloride      Assessment & Plan     Principal Problem:     Kidney stone    Large obstructing proximal right ureteral calculus    Plan  I discussed treatment options with the patient.  She opted proceed with shockwave lithotripsy.  This will be arranged for this morning at [facility] outpatient surgery.  She needs to be there by 9:15 AM.  She understands that a stent will be placed at that time also and that this is the first of a planned staged procedure.  Risk, benefits, alternatives have been discussed with the patient and she wishes to proceed.      I discussed the patient's findings and my recommendations with patient and nursing staff    Thank you for this  consult    Ian Moura MD  11/07/24  07:31 EST

## 2024-11-11 LAB
BACTERIA SPEC AEROBE CULT: NORMAL
BACTERIA SPEC AEROBE CULT: NORMAL

## 2024-11-21 ENCOUNTER — LAB REQUISITION (OUTPATIENT)
Dept: LAB | Facility: HOSPITAL | Age: 34
End: 2024-11-21
Payer: COMMERCIAL

## 2024-11-21 DIAGNOSIS — N20.1 CALCULUS OF URETER: ICD-10-CM

## 2024-11-21 PROCEDURE — 82365 CALCULUS SPECTROSCOPY: CPT | Performed by: UROLOGY

## 2024-11-26 LAB
CALCIUM OXALATE DIHYDRATE MFR STONE IR: 20 %
COLOR STONE: NORMAL
COM MFR STONE: 80 %
COMPN STONE: NORMAL
LABORATORY COMMENT REPORT: NORMAL
LABORATORY COMMENT REPORT: NORMAL
Lab: NORMAL
Lab: NORMAL
PHOTO: NORMAL
SIZE STONE: NORMAL MM
SPEC SOURCE SUBJ: NORMAL
WT STONE: 217 MG